# Patient Record
Sex: FEMALE | Race: WHITE | NOT HISPANIC OR LATINO
[De-identification: names, ages, dates, MRNs, and addresses within clinical notes are randomized per-mention and may not be internally consistent; named-entity substitution may affect disease eponyms.]

---

## 2018-08-28 ENCOUNTER — TRANSCRIPTION ENCOUNTER (OUTPATIENT)
Age: 68
End: 2018-08-28

## 2018-10-07 ENCOUNTER — TRANSCRIPTION ENCOUNTER (OUTPATIENT)
Age: 68
End: 2018-10-07

## 2021-11-16 ENCOUNTER — NON-APPOINTMENT (OUTPATIENT)
Age: 71
End: 2021-11-16

## 2021-11-22 PROBLEM — Z00.00 ENCOUNTER FOR PREVENTIVE HEALTH EXAMINATION: Status: ACTIVE | Noted: 2021-11-22

## 2021-11-23 ENCOUNTER — APPOINTMENT (OUTPATIENT)
Dept: PAIN MANAGEMENT | Facility: CLINIC | Age: 71
End: 2021-11-23
Payer: MEDICARE

## 2021-11-23 VITALS
HEIGHT: 63 IN | BODY MASS INDEX: 35.44 KG/M2 | TEMPERATURE: 98 F | DIASTOLIC BLOOD PRESSURE: 80 MMHG | SYSTOLIC BLOOD PRESSURE: 168 MMHG | WEIGHT: 200 LBS

## 2021-11-23 DIAGNOSIS — Z78.9 OTHER SPECIFIED HEALTH STATUS: ICD-10-CM

## 2021-11-23 PROCEDURE — 99204 OFFICE O/P NEW MOD 45 MIN: CPT

## 2021-11-23 RX ORDER — DIAZEPAM 2 MG/1
2 TABLET ORAL
Qty: 1 | Refills: 0 | Status: ACTIVE | COMMUNITY
Start: 2021-11-23 | End: 1900-01-01

## 2021-11-23 NOTE — ASSESSMENT
[FreeTextEntry1] : 71 yof w/ severe low back pain.\par \par The patient has failed to have relief with over six weeks of physical therapy within the last three months and all medications. GIven their failure to improve with all other conservative measures recommend MRI lumbar spine. Patient will return to review imaging and plan for potential intervention.\par \par Diazepam 2 mg prior to MRI for extreme claustrophobia\par \par Physical therapy - increase ROM, strengthening, postural training, other modalities ad radha\par \par Patient advised on dieting and lifestyle modifications, as weight loss will be essential to improving the patients pain levels and overall health.\par \par Informed patient that risks associated with the COVID-19 infection. Informed patient steps taken to limit the risks. We are implementing safety precautions and following protocols consistent with the CDC and state recommendations. All patients and staff will be checked for fever or signs of illness upon entry to the facility. We will limit our steroid dose to the lowest effective therapeutic dose or in some cases steroids will not be injected at all if procedure is to be performed.\par \par

## 2021-11-23 NOTE — HISTORY OF PRESENT ILLNESS
[___ yrs] : [unfilled] year(s) ago [Paroxysmal] : paroxysmal [5] : an average pain level of 5/10 [3] : a minimum pain level of 3/10 [10] : a maximum pain level of 10/10 [Sharp] : sharp [Aching] : aching [Throbbing] : throbbing [Burning] : burning [Shooting] : shooting [Walking] : walking [Sitting] : sitting [Heat] : heat [FreeTextEntry1] : 71 yof presents with back pain. Physical therapy has helped her the most. She has had LYNN and facet injections w/ Dr. Nolan previously without much relief. No pain sitting or sleeping. She recently retired. Walking is the worst for her. Pain is in her low back. Pain does not radiate to the legs. There is no pain in the buttocks.  [FreeTextEntry2] : 15 [FreeTextEntry7] : No referral (social media) Lower back  [de-identified] : nly when walking  [FreeTextEntry4] : pt

## 2021-11-23 NOTE — PHYSICAL EXAM

## 2021-12-20 ENCOUNTER — RESULT REVIEW (OUTPATIENT)
Age: 71
End: 2021-12-20

## 2021-12-22 ENCOUNTER — APPOINTMENT (OUTPATIENT)
Dept: PAIN MANAGEMENT | Facility: CLINIC | Age: 71
End: 2021-12-22
Payer: MEDICARE

## 2021-12-22 VITALS
DIASTOLIC BLOOD PRESSURE: 101 MMHG | HEIGHT: 63 IN | BODY MASS INDEX: 35.44 KG/M2 | WEIGHT: 200 LBS | SYSTOLIC BLOOD PRESSURE: 173 MMHG | TEMPERATURE: 98 F

## 2021-12-22 PROCEDURE — 99214 OFFICE O/P EST MOD 30 MIN: CPT

## 2021-12-22 RX ORDER — TIZANIDINE 2 MG/1
2 TABLET ORAL
Qty: 90 | Refills: 0 | Status: ACTIVE | COMMUNITY
Start: 2021-12-22 | End: 1900-01-01

## 2021-12-22 NOTE — HISTORY OF PRESENT ILLNESS
[___ yrs] : [unfilled] year(s) ago [Paroxysmal] : paroxysmal [5] : an average pain level of 5/10 [3] : a minimum pain level of 3/10 [10] : a maximum pain level of 10/10 [Sharp] : sharp [Aching] : aching [Throbbing] : throbbing [Burning] : burning [Shooting] : shooting [Walking] : walking [Sitting] : sitting [Heat] : heat [FreeTextEntry1] : Interval History:\par Patient returns today to review MRI. Her pain remains in her low back. Quality of life is impaired. There has been a severe exacerbation of the patient's chronic pain. [place] \par \par HPI: 71 yof presents with back pain. Physical therapy has helped her the most. She has had LYNN and facet injections w/ Dr. Nolan previously without much relief. No pain sitting or sleeping. She recently retired. Walking is the worst for her. Pain is in her low back. Pain does not radiate to the legs. There is no pain in the buttocks.  [FreeTextEntry2] : 15 [FreeTextEntry7] : No referral (social media) Lower back  [de-identified] : nly when walking  [FreeTextEntry4] : pt

## 2021-12-22 NOTE — ASSESSMENT
[FreeTextEntry1] : 71 yof w/ severe low back pain.\par \par I have personally reviewed the patient's MRI in detail and discussed it with them which is significant for multiple levels of stenosis.\par \par Physical therapy prescribed - goal will be to increase ROM, strengthening, postural training, other modalities ad radha which may include massage and stim. Goals of therapy discussed with the patient in detail and will be discussed with physical therapist. Patient will follow-up following course of physical therapy to monitor progress and adjust therapy as needed.\par \par Acetaminophen 1,000 mg q8h prn for moderate pain. Risks, benefits, and alternatives of acetaminophen discussed with patient.\par \par Ibuprofen 600 mg q8h prn add when pain is not adequately controlled with acetaminophen. Risks, benefits, and alternatives of ibuprofen discussed with patient.\par \par Diet and nutritional strategies discussed which may improve patients pain and will improve overall health.\par \par If no relief plan for intervention.

## 2021-12-22 NOTE — DATA REVIEWED
[FreeTextEntry1] : 12/20/21\par MRI LUMBAR SPINE\par \par  When counting inferiorly from craniocervical junction on total spine localizer sequences, there\par appear to be 7 cervical type, 12 thoracic-type and 5 lumbar type vertebral bodies. For purposes of\par this report, vertebral body at level of the iliolumbar ligament will be designated as L5.\par Inferiormost well-formed intervertebral disc space will be designated as L5-S1. No MR evidence for\par transitional lumbosacral anatomy.\par \par \par  Lumbar lordosis is maintained. There is marked levocurvature centered at the upper to mid lumbar\par spine, marked dextrocurvature centered at mid thoracic spine and mild to moderate levocurvature of\par centered at the lower lumbar spine. Left lateral listhesis of L3-L4, right lateral listhesis of L1-\par L2 No significant spondylolisthesis. There are multilevel degenerative endplate changes with\par osteophyte formation, intervertebral disc space narrowing/desiccation, Schmorl's nodes and endplate\par irregularity. Vertebral body heights are maintained. Vertebral body bone marrow signal is\par heterogeneous, nonspecific may be related to underlying osteopenia. No abnormal cord signal\par identified. Conus terminates at L1-L2. No significant periarticular or paraspinal soft tissue edema\par is identified. No suspicious expansile or destructive osseous lesion identified. Multiple\par hemangiomas are noted throughout the thoracic and lumbar spine. Paraspinal soft tissues are\par unremarkable.\par \par  There are multilevel findings as follows:\par \par \par \par  T12-L1: No significant canal or foraminal stenosis.\par \par  L1-L2: Bilateral facet arthropathy, ligamentous hypertrophy contributing to no significant canal\par stenosis and mild right foraminal stenosis.\par \par  L2-L3: Disc bulge, bilateral facet arthropathy, ligamentous hypertrophy contributing to no\par significant canal stenosis and mild bilateral foraminal stenosis, right greater than left.\par \par \par  L3-L4: Disc bulge, bilateral facet arthropathy, ligamentous hypertrophy contributing to severe\par canal stenosis and moderate to severe bilateral foraminal stenosis, right greater than left. Severe\par bilateral lateral recess stenosis with involvement of descending L4 nerve roots, right greater than\par left\par \par  L4-L5: Disc bulge, bilateral facet arthropathy with effusions, ligamentous hypertrophy contributing\par to no significant canal stenosis and moderate bilateral foraminal stenosis, left greater than right.\par Moderate bilateral lateral recess stenosis with involvement of descending L5 nerve roots, left\par greater than right\par \par  L5-S1: Disc bulge, bilateral facet arthropathy, ligamentous hypertrophy contributing to mild canal\par stenosis and mild to moderate bilateral foraminal stenosis, left greater than right.\par \par \par \par  IMPRESSION:\par \par  Multilevel lumbar spondylitic changes as detailed above notable for severe canal stenosis at L3-L4,\par moderate to severe bilateral foraminal stenosis at L3-L4, L4-L5, L5-S1 involving exiting L3, L4, L5\par nerve roots, respectively and moderate to severe bilateral lateral recess stenosis at L3-L4, L4-L5,\par L5-S1 involving descending L4, L5 and S1 nerve roots, respectively.\par \par  Marked S-shaped curvature of the thoracolumbar spine which contributes to predominantly right-sided\par foraminal/lateral recess stenosis of the upper to mid lumbar spine and left-sided foraminal/lateral\par recess stenosis of the lower lumbar spine.\par \par \par \par \par

## 2021-12-22 NOTE — PHYSICAL EXAM

## 2022-02-20 ENCOUNTER — TRANSCRIPTION ENCOUNTER (OUTPATIENT)
Age: 72
End: 2022-02-20

## 2022-06-01 ENCOUNTER — APPOINTMENT (OUTPATIENT)
Dept: PAIN MANAGEMENT | Facility: CLINIC | Age: 72
End: 2022-06-01
Payer: MEDICARE

## 2022-06-01 VITALS
DIASTOLIC BLOOD PRESSURE: 83 MMHG | SYSTOLIC BLOOD PRESSURE: 119 MMHG | TEMPERATURE: 98 F | WEIGHT: 192 LBS | HEIGHT: 63 IN | BODY MASS INDEX: 34.02 KG/M2

## 2022-06-01 PROCEDURE — 99214 OFFICE O/P EST MOD 30 MIN: CPT

## 2022-06-01 RX ORDER — CYCLOBENZAPRINE HYDROCHLORIDE 5 MG/1
5 TABLET, FILM COATED ORAL 3 TIMES DAILY
Qty: 90 | Refills: 0 | Status: ACTIVE | COMMUNITY
Start: 2022-06-01 | End: 1900-01-01

## 2022-06-01 RX ORDER — IRBESARTAN AND HYDROCHLOROTHIAZIDE 300; 12.5 MG/1; MG/1
300-12.5 TABLET ORAL
Qty: 90 | Refills: 0 | Status: ACTIVE | COMMUNITY
Start: 2022-05-09

## 2022-06-01 RX ORDER — PREDNISONE 20 MG/1
20 TABLET ORAL
Qty: 10 | Refills: 0 | Status: ACTIVE | COMMUNITY
Start: 2022-02-20

## 2022-06-01 NOTE — ASSESSMENT
[FreeTextEntry1] : 71 yof w/ severe low back pain for follow-up.\par \par I have personally reviewed the patient's MRI in detail and discussed it with them which is significant for multiple levels of stenosis.\par \par Physical therapy prescribed - goal will be to increase ROM, strengthening, postural training, other modalities ad radha which may include massage and stim. Goals of therapy discussed with the patient in detail and will be discussed with physical therapist. Patient will follow-up following course of physical therapy to monitor progress and adjust therapy as needed.\par \par Acetaminophen 1,000 mg q8h prn for moderate pain. Risks, benefits, and alternatives of acetaminophen discussed with patient.\par \par Ibuprofen 600 mg q8h prn add when pain is not adequately controlled with acetaminophen. Risks, benefits, and alternatives of ibuprofen discussed with patient.\par \par Diet and nutritional strategies discussed which may improve patients pain and will improve overall health.\par \par If no relief plan for intervention. \par \par She reports side effects of sever fatigue with prednisone.\par \par RTC three months.

## 2022-06-01 NOTE — HISTORY OF PRESENT ILLNESS
[___ yrs] : [unfilled] year(s) ago [Paroxysmal] : paroxysmal [5] : an average pain level of 5/10 [3] : a minimum pain level of 3/10 [10] : a maximum pain level of 10/10 [Sharp] : sharp [Aching] : aching [Throbbing] : throbbing [Burning] : burning [Shooting] : shooting [Walking] : walking [Sitting] : sitting [Heat] : heat [FreeTextEntry1] : Interval History:\par Patient returns today. Her PT got interrupted due to severe headaches and fatigue which began suddenly.\par \par HPI: 71 yof presents with back pain. Physical therapy has helped her the most. She has had LYNN and facet injections w/ Dr. Nolan previously without much relief. No pain sitting or sleeping. She recently retired. Walking is the worst for her. Pain is in her low back. Pain does not radiate to the legs. There is no pain in the buttocks.  [FreeTextEntry2] : 15 [FreeTextEntry7] : No referral (social media) Lower back  [de-identified] : nly when walking  [FreeTextEntry4] : pt

## 2022-06-01 NOTE — PHYSICAL EXAM

## 2023-03-09 ENCOUNTER — APPOINTMENT (OUTPATIENT)
Dept: PAIN MANAGEMENT | Facility: CLINIC | Age: 73
End: 2023-03-09
Payer: MEDICARE

## 2023-03-09 VITALS
SYSTOLIC BLOOD PRESSURE: 160 MMHG | WEIGHT: 192 LBS | HEIGHT: 63 IN | DIASTOLIC BLOOD PRESSURE: 113 MMHG | BODY MASS INDEX: 34.02 KG/M2 | TEMPERATURE: 98 F

## 2023-03-09 PROCEDURE — 99213 OFFICE O/P EST LOW 20 MIN: CPT

## 2023-03-09 NOTE — HISTORY OF PRESENT ILLNESS
[___ yrs] : [unfilled] year(s) ago [Paroxysmal] : paroxysmal [5] : an average pain level of 5/10 [3] : a minimum pain level of 3/10 [10] : a maximum pain level of 10/10 [Sharp] : sharp [Aching] : aching [Throbbing] : throbbing [Burning] : burning [Shooting] : shooting [Walking] : walking [Sitting] : sitting [Heat] : heat [FreeTextEntry1] : Interval History:\par Patient returns today. Her PT got interrupted due to severe headaches and fatigue which began suddenly. Pt has now been working well. Wishes to avoid interventions.\par \par HPI: 71 yof presents with back pain. Physical therapy has helped her the most. She has had LYNN and facet injections w/ Dr. Nolan previously without much relief. No pain sitting or sleeping. She recently retired. Walking is the worst for her. Pain is in her low back. Pain does not radiate to the legs. There is no pain in the buttocks.  [FreeTextEntry2] : 15 [FreeTextEntry7] : No referral (social media) Lower back  [de-identified] : nly when walking  [FreeTextEntry4] : pt

## 2023-03-09 NOTE — ASSESSMENT
[FreeTextEntry1] : 73 yof w/ severe low back pain for follow-up.\par \par I have personally reviewed the patient's MRI in detail and discussed it with them which is significant for multiple levels of stenosis.\par \par Physical therapy prescribed - goal will be to increase ROM, strengthening, postural training, other modalities ad radha which may include massage and stim. Goals of therapy discussed with the patient in detail and will be discussed with physical therapist. Patient will follow-up following course of physical therapy to monitor progress and adjust therapy as needed.\par \par Acetaminophen 1,000 mg q8h prn for moderate pain. Risks, benefits, and alternatives of acetaminophen discussed with patient.\par \par Ibuprofen 600 mg q8h prn add when pain is not adequately controlled with acetaminophen. Risks, benefits, and alternatives of ibuprofen discussed with patient.\par \par Diet and nutritional strategies discussed which may improve patients pain and will improve overall health.\par \par If no relief plan for intervention. \par \par RTC three months.

## 2023-03-09 NOTE — PHYSICAL EXAM

## 2023-10-26 ENCOUNTER — APPOINTMENT (OUTPATIENT)
Dept: PAIN MANAGEMENT | Facility: CLINIC | Age: 73
End: 2023-10-26
Payer: MEDICARE

## 2023-10-26 VITALS
SYSTOLIC BLOOD PRESSURE: 120 MMHG | WEIGHT: 192 LBS | HEIGHT: 63 IN | DIASTOLIC BLOOD PRESSURE: 64 MMHG | BODY MASS INDEX: 34.02 KG/M2 | TEMPERATURE: 97.1 F

## 2023-10-26 PROCEDURE — 99214 OFFICE O/P EST MOD 30 MIN: CPT

## 2024-01-12 ENCOUNTER — APPOINTMENT (OUTPATIENT)
Dept: GASTROENTEROLOGY | Facility: CLINIC | Age: 74
End: 2024-01-12
Payer: MEDICARE

## 2024-01-12 VITALS
DIASTOLIC BLOOD PRESSURE: 80 MMHG | HEIGHT: 63 IN | WEIGHT: 192 LBS | SYSTOLIC BLOOD PRESSURE: 120 MMHG | BODY MASS INDEX: 34.02 KG/M2

## 2024-01-12 DIAGNOSIS — Z12.11 ENCOUNTER FOR SCREENING FOR MALIGNANT NEOPLASM OF COLON: ICD-10-CM

## 2024-01-12 DIAGNOSIS — Z86.010 PERSONAL HISTORY OF COLONIC POLYPS: ICD-10-CM

## 2024-01-12 PROCEDURE — 99202 OFFICE O/P NEW SF 15 MIN: CPT

## 2024-01-12 NOTE — REASON FOR VISIT
[Consultation] : a consultation visit [FreeTextEntry1] : Kindly asked by Dr. Hernandez  to consult and evaluate patient for   colon screening                  A copy of this note is being sent to physician requesting consultation.

## 2024-01-12 NOTE — CONSULT LETTER
[FreeTextEntry1] : Dear Dr. ABY JACKSON ,  I had the pleasure of evaluating your patient,  MADELYN ENGEL.  Please refer to my note below.  Thank you very much for allowing me to participate in the care of this patient.  If you have any questions, please do not hesitate to contact me.  Sincerely,   Nelson Amaral MD

## 2024-01-12 NOTE — HISTORY OF PRESENT ILLNESS
[FreeTextEntry1] : 73f chronic back pain, HTN, presenting for colon cancer screening. Pt denies abdominal pain, rectal bleeding, change in bowel habits, or unexplained weight loss.    Last colonoscopy: 11/2015 colonoscopy - adenoma, diverticulosis  Soc:  no tobacco or significant EtOH FHx: no FHx GI malignancy or IBD  ROS: Constitutional:: no weight loss, fevers ENT: no deafness Eyes: not blind Neck: no LN Chest: no dyspnea/cough Cardiac: no chest pain Vascular: no leg swelling GI: no abdominal pain, nausea, vomiting, diarrhea, constipation, rectal bleeding, dysphagia, melena unless otherwise noted in HPI : no dysuria, dark urine Skin: no rashes, jaundice Heme: no bleeding Endocrine: no DM unless otherwise stated in HPI  Px: (VS noted below) General: NAD Eyes: anicteric Oropharynx:  clear Neck: no LN Chest: normal respiratory effort CVS: regular Abd: soft, NT, ND, +BS, no HSM Ext: no atrophy Neuro: grossly nonfocal  Labs/imaging/prior endoscopic results reviewed to the extent available and noted in HPI

## 2024-01-12 NOTE — ASSESSMENT
[FreeTextEntry1] : - Colon cancer screening - colonoscopy due. Colonoscopy scheduled - Risks, benefits, alternatives were discussed, including but not limited to bleeding, infection, perforation and sedation risks. Additionally, the possibility of missed lesions was conveyed.  PMD/consultation/hospital notes and Labs/imaging/prior endoscopic results reviewed to extent noted in HPI; and, if procedure code billed on this visit for lab draw, this serves to signify that labs were drawn here in this office.

## 2024-02-15 ENCOUNTER — APPOINTMENT (OUTPATIENT)
Dept: PAIN MANAGEMENT | Facility: CLINIC | Age: 74
End: 2024-02-15
Payer: MEDICARE

## 2024-02-15 VITALS
SYSTOLIC BLOOD PRESSURE: 168 MMHG | DIASTOLIC BLOOD PRESSURE: 105 MMHG | WEIGHT: 192 LBS | BODY MASS INDEX: 34.02 KG/M2 | HEIGHT: 63 IN

## 2024-02-15 DIAGNOSIS — M79.10 MYALGIA, UNSPECIFIED SITE: ICD-10-CM

## 2024-02-15 DIAGNOSIS — M47.817 SPONDYLOSIS W/OUT MYELOPATHY OR RADICULOPATHY, LUMBOSACRAL REGION: ICD-10-CM

## 2024-02-15 DIAGNOSIS — M54.16 RADICULOPATHY, LUMBAR REGION: ICD-10-CM

## 2024-02-15 PROCEDURE — 99214 OFFICE O/P EST MOD 30 MIN: CPT

## 2024-02-15 NOTE — HISTORY OF PRESENT ILLNESS
[___ yrs] : [unfilled] year(s) ago [Paroxysmal] : paroxysmal [3] : a minimum pain level of 3/10 [10] : a maximum pain level of 10/10 [Sharp] : sharp [Aching] : aching [Throbbing] : throbbing [Burning] : burning [Shooting] : shooting [Walking] : walking [Sitting] : sitting [Heat] : heat [5] : 3. What number best describes how, during the past week, pain has interfered with your general activity? 5/10 pain [FreeTextEntry1] : Interval History: Patient returns today. Pt has now been working well. Wishes to avoid interventions. Wishes to continue PT. No other changes in health.   HPI: 71 yof presents with back pain. Physical therapy has helped her the most. She has had LYNN and facet injections w/ Dr. Nolan previously without much relief. No pain sitting or sleeping. She recently retired. Walking is the worst for her. Pain is in her low back. Pain does not radiate to the legs. There is no pain in the buttocks.  [FreeTextEntry7] : No referral (social media) Lower back  [de-identified] : nly when walking  [FreeTextEntry4] : pt [FreeTextEntry2] : 15

## 2024-02-15 NOTE — DATA REVIEWED
[FreeTextEntry1] : 12/20/21 MRI LUMBAR SPINE   When counting inferiorly from craniocervical junction on total spine localizer sequences, there appear to be 7 cervical type, 12 thoracic-type and 5 lumbar type vertebral bodies. For purposes of this report, vertebral body at level of the iliolumbar ligament will be designated as L5. Inferiormost well-formed intervertebral disc space will be designated as L5-S1. No MR evidence for transitional lumbosacral anatomy.    Lumbar lordosis is maintained. There is marked levocurvature centered at the upper to mid lumbar spine, marked dextrocurvature centered at mid thoracic spine and mild to moderate levocurvature of centered at the lower lumbar spine. Left lateral listhesis of L3-L4, right lateral listhesis of L1- L2 No significant spondylolisthesis. There are multilevel degenerative endplate changes with osteophyte formation, intervertebral disc space narrowing/desiccation, Schmorl's nodes and endplate irregularity. Vertebral body heights are maintained. Vertebral body bone marrow signal is heterogeneous, nonspecific may be related to underlying osteopenia. No abnormal cord signal identified. Conus terminates at L1-L2. No significant periarticular or paraspinal soft tissue edema is identified. No suspicious expansile or destructive osseous lesion identified. Multiple hemangiomas are noted throughout the thoracic and lumbar spine. Paraspinal soft tissues are unremarkable.   There are multilevel findings as follows:     T12-L1: No significant canal or foraminal stenosis.   L1-L2: Bilateral facet arthropathy, ligamentous hypertrophy contributing to no significant canal stenosis and mild right foraminal stenosis.   L2-L3: Disc bulge, bilateral facet arthropathy, ligamentous hypertrophy contributing to no significant canal stenosis and mild bilateral foraminal stenosis, right greater than left.    L3-L4: Disc bulge, bilateral facet arthropathy, ligamentous hypertrophy contributing to severe canal stenosis and moderate to severe bilateral foraminal stenosis, right greater than left. Severe bilateral lateral recess stenosis with involvement of descending L4 nerve roots, right greater than left   L4-L5: Disc bulge, bilateral facet arthropathy with effusions, ligamentous hypertrophy contributing to no significant canal stenosis and moderate bilateral foraminal stenosis, left greater than right. Moderate bilateral lateral recess stenosis with involvement of descending L5 nerve roots, left greater than right   L5-S1: Disc bulge, bilateral facet arthropathy, ligamentous hypertrophy contributing to mild canal stenosis and mild to moderate bilateral foraminal stenosis, left greater than right.     IMPRESSION:   Multilevel lumbar spondylitic changes as detailed above notable for severe canal stenosis at L3-L4, moderate to severe bilateral foraminal stenosis at L3-L4, L4-L5, L5-S1 involving exiting L3, L4, L5 nerve roots, respectively and moderate to severe bilateral lateral recess stenosis at L3-L4, L4-L5, L5-S1 involving descending L4, L5 and S1 nerve roots, respectively.   Marked S-shaped curvature of the thoracolumbar spine which contributes to predominantly right-sided foraminal/lateral recess stenosis of the upper to mid lumbar spine and left-sided foraminal/lateral recess stenosis of the lower lumbar spine.

## 2024-02-15 NOTE — PHYSICAL EXAM

## 2024-02-15 NOTE — ASSESSMENT
[FreeTextEntry1] : 73 yof w/ severe low back pain for follow-up, no new changes.   I have personally reviewed the patient's MRI in detail and discussed it with them which is significant for multiple levels of stenosis.   Physical therapy prescribed - goal will be to increase ROM, strengthening, postural training, other modalities ad radha which may include massage and stim. Goals of therapy discussed with the patient in detail and will be discussed with physical therapist. Patient will follow-up following course of physical therapy to monitor progress and adjust therapy as needed.   Acetaminophen 1,000 mg q8h prn for moderate pain. Risks, benefits, and alternatives of acetaminophen discussed with patient.    Ibuprofen 600 mg q8h prn add when pain is not adequately controlled with acetaminophen. Risks, benefits, and alternatives of ibuprofen discussed with patient.    Diet and nutritional strategies discussed which may improve patients pain and will improve overall health.    If no relief plan for intervention.    RTC three months.

## 2024-02-19 ENCOUNTER — RESULT REVIEW (OUTPATIENT)
Age: 74
End: 2024-02-19

## 2024-02-20 ENCOUNTER — APPOINTMENT (OUTPATIENT)
Dept: GASTROENTEROLOGY | Facility: HOSPITAL | Age: 74
End: 2024-02-20

## 2025-03-25 ENCOUNTER — APPOINTMENT (OUTPATIENT)
Dept: PAIN MANAGEMENT | Facility: CLINIC | Age: 75
End: 2025-03-25

## 2025-03-31 ENCOUNTER — APPOINTMENT (OUTPATIENT)
Dept: PAIN MANAGEMENT | Facility: CLINIC | Age: 75
End: 2025-03-31
Payer: MEDICARE

## 2025-03-31 VITALS
HEIGHT: 63 IN | BODY MASS INDEX: 34.02 KG/M2 | WEIGHT: 192 LBS | SYSTOLIC BLOOD PRESSURE: 134 MMHG | DIASTOLIC BLOOD PRESSURE: 83 MMHG

## 2025-03-31 DIAGNOSIS — M47.817 SPONDYLOSIS W/OUT MYELOPATHY OR RADICULOPATHY, LUMBOSACRAL REGION: ICD-10-CM

## 2025-03-31 DIAGNOSIS — M25.551 PAIN IN RIGHT HIP: ICD-10-CM

## 2025-03-31 DIAGNOSIS — M79.10 MYALGIA, UNSPECIFIED SITE: ICD-10-CM

## 2025-03-31 DIAGNOSIS — M25.552 PAIN IN LEFT HIP: ICD-10-CM

## 2025-03-31 DIAGNOSIS — M54.16 RADICULOPATHY, LUMBAR REGION: ICD-10-CM

## 2025-03-31 PROCEDURE — G2211 COMPLEX E/M VISIT ADD ON: CPT

## 2025-03-31 PROCEDURE — 99214 OFFICE O/P EST MOD 30 MIN: CPT

## 2025-04-28 ENCOUNTER — APPOINTMENT (OUTPATIENT)
Dept: PAIN MANAGEMENT | Facility: CLINIC | Age: 75
End: 2025-04-28
Payer: MEDICARE

## 2025-04-28 VITALS
HEART RATE: 70 BPM | SYSTOLIC BLOOD PRESSURE: 125 MMHG | RESPIRATION RATE: 16 BRPM | OXYGEN SATURATION: 95 % | DIASTOLIC BLOOD PRESSURE: 74 MMHG

## 2025-04-28 DIAGNOSIS — M54.16 RADICULOPATHY, LUMBAR REGION: ICD-10-CM

## 2025-04-28 PROCEDURE — 62323 NJX INTERLAMINAR LMBR/SAC: CPT

## 2025-05-15 ENCOUNTER — APPOINTMENT (OUTPATIENT)
Dept: PAIN MANAGEMENT | Facility: CLINIC | Age: 75
End: 2025-05-15